# Patient Record
Sex: MALE | Race: OTHER | Employment: PART TIME | URBAN - METROPOLITAN AREA
[De-identification: names, ages, dates, MRNs, and addresses within clinical notes are randomized per-mention and may not be internally consistent; named-entity substitution may affect disease eponyms.]

---

## 2023-11-02 ENCOUNTER — APPOINTMENT (OUTPATIENT)
Dept: GENERAL RADIOLOGY | Age: 33
End: 2023-11-02
Payer: MEDICAID

## 2023-11-02 ENCOUNTER — HOSPITAL ENCOUNTER (EMERGENCY)
Age: 33
Discharge: HOME | End: 2023-11-02
Payer: MEDICAID

## 2023-11-02 VITALS
HEART RATE: 73 BPM | RESPIRATION RATE: 18 BRPM | DIASTOLIC BLOOD PRESSURE: 96 MMHG | TEMPERATURE: 99.1 F | SYSTOLIC BLOOD PRESSURE: 141 MMHG | OXYGEN SATURATION: 99 %

## 2023-11-02 DIAGNOSIS — S90.121A CONTUSION OF LESSER TOE OF RIGHT FOOT WITHOUT DAMAGE TO NAIL, INITIAL ENCOUNTER: Primary | ICD-10-CM

## 2023-11-02 PROCEDURE — 73630 X-RAY EXAM OF FOOT: CPT

## 2023-11-02 PROCEDURE — 99283 EMERGENCY DEPT VISIT LOW MDM: CPT

## 2023-11-02 NOTE — ED PROVIDER NOTES
Damon [facility] ENCOUNTER        Pt Name: Soheila Hanley  MRN: [id number]  [address] [facility] [place] 1990  Date of evaluation: 11/2/2023  Provider: Terrance Agosto PA-C  PCP: No primary care provider on file. Note Started: 1:15 PM EDT 11/2/23      ROSALBA. I have evaluated this patient. CHIEF COMPLAINT       Chief Complaint   Patient presents with    Foot Pain     Patient in with complaints of pain to the right foot/toe from injury        HISTORY OF PRESENT ILLNESS: 1 or more Elements     History From: patient  Limitations to history : Language language line [id number] was used to obtain history with     Soheila Hanley is a 33 y.o. male who presents to the emergency department with a chief complaint of right toe pain in his second and third toes. He states 4 days ago he was walking when he jammed his foot into the frame of the door. He denies any pain to sitting there and states that he has pain mostly when he is putting his shoes on. Denies any bleeding or any other injury. Nursing Notes were all reviewed and agreed with or any disagreements were addressed in the HPI. REVIEW OF SYSTEMS :      Review of Systems    Positives and Pertinent negatives as per HPI. SURGICAL HISTORY   History reviewed. No pertinent surgical history. CURRENTMEDICATIONS       There are no discharge medications for this patient. ALLERGIES     Penicillins    FAMILYHISTORY     History reviewed. No pertinent family history.      SOCIAL HISTORY       Social History     Tobacco Use    Smoking status: Never    Smokeless tobacco: Never   Substance Use Topics    Alcohol use: Never    Drug use: Never       SCREENINGS        Sandra Coma Scale  Eye Opening: Spontaneous  Best Verbal Response: Oriented  Best Motor Response: Obeys commands  Sandra Coma Scale Score: 15                CIWA Assessment  BP: (!) 141/96  Pulse: 73           PHYSICAL EXAM  1 or more

## 2023-11-02 NOTE — ED NOTES
This RN reviewed discharge instructions with patient. Informed pt about follow-up care. Pt had no questions, comments, or concerns at this time.        Vincent Wang RN  11/02/23 9076

## 2024-07-13 ENCOUNTER — HOSPITAL ENCOUNTER (EMERGENCY)
Age: 34
Discharge: HOME | End: 2024-07-13
Payer: MEDICAID

## 2024-07-13 DIAGNOSIS — Z53.21 ELOPED FROM EMERGENCY DEPARTMENT: Primary | ICD-10-CM

## 2024-07-13 DIAGNOSIS — R11.2 NAUSEA AND VOMITING, UNSPECIFIED VOMITING TYPE: ICD-10-CM

## 2024-07-13 DIAGNOSIS — R55 SYNCOPE AND COLLAPSE: ICD-10-CM

## 2024-07-13 LAB
EKG ATRIAL RATE: 59 BPM
EKG DIAGNOSIS: NORMAL
EKG P AXIS: 64 DEGREES
EKG P-R INTERVAL: 158 MS
EKG Q-T INTERVAL: 422 MS
EKG QRS DURATION: 96 MS
EKG QTC CALCULATION (BAZETT): 417 MS
EKG R AXIS: 40 DEGREES
EKG T AXIS: 22 DEGREES
EKG VENTRICULAR RATE: 59 BPM

## 2024-07-13 PROCEDURE — 99283 EMERGENCY DEPT VISIT LOW MDM: CPT

## 2024-07-13 PROCEDURE — 93005 ELECTROCARDIOGRAM TRACING: CPT | Performed by: STUDENT IN AN ORGANIZED HEALTH CARE EDUCATION/TRAINING PROGRAM

## 2024-07-13 PROCEDURE — 93010 ELECTROCARDIOGRAM REPORT: CPT | Performed by: INTERNAL MEDICINE

## 2024-07-13 ASSESSMENT — ENCOUNTER SYMPTOMS
SHORTNESS OF BREATH: 0
CHEST TIGHTNESS: 0
ABDOMINAL PAIN: 0
VOMITING: 1
DIARRHEA: 0
NAUSEA: 1

## 2024-07-13 NOTE — ED PROVIDER NOTES
[facility] EMERGENCY DEPARTMENT  EMERGENCY DEPARTMENT ENCOUNTER        Pt Name: Prosper Willams  MRN: [id number]  Birthdate 1990  Date of evaluation: 7/13/2024  Provider: AIDEN Campuzano CNP  PCP: No primary care provider on file.  Note Started: 1:57 AM EDT 7/13/24      ROSALBA. I have evaluated this patient.        CHIEF COMPLAINT       Nausea with vomiting that began today, patient passed out in the waiting room      HISTORY OF PRESENT ILLNESS: 1 or more Elements     History from : Patient    Limitations to history : Language Sinhala    Prosper Willams is a 33 y.o. male who presents to the ER with nausea and vomiting that began today, earlier without diarrhea.  The patient did pass out once he arrived to the emergency department.  He was moved back to triage to be for an EKG.  I did see the patient at that time, he is diaphoretic.  Complaining of abdominal pain and feeling poorly.    Denies any fever, visual disturbances.  No chest pain or pressure.  No neck or back pain.  No shortness of breath, cough, or congestion.  No diarrhea, constipation, or dysuria.  No rash.    Nursing Notes were all reviewed and agreed with or any disagreements were addressed in the HPI.    REVIEW OF SYSTEMS :      Review of Systems   Constitutional:  Positive for diaphoresis. Negative for activity change, chills and fever.   Respiratory:  Negative for chest tightness and shortness of breath.    Cardiovascular:  Negative for chest pain.   Gastrointestinal:  Positive for nausea and vomiting. Negative for abdominal pain and diarrhea.   Genitourinary:  Negative for dysuria.   Neurological:  Positive for syncope.   All other systems reviewed and are negative.      Positives and Pertinent negatives as per HPI.     SURGICAL HISTORY   No past surgical history on file.    CURRENTMEDICATIONS       Previous Medications    No medications on file       ALLERGIES     Penicillins    FAMILYHISTORY     No family

## 2024-08-02 ENCOUNTER — HOSPITAL ENCOUNTER (EMERGENCY)
Age: 34
Discharge: HOME | End: 2024-08-02
Attending: INTERNAL MEDICINE
Payer: MEDICAID

## 2024-08-02 VITALS
HEIGHT: 74 IN | HEART RATE: 100 BPM | TEMPERATURE: 100.3 F | OXYGEN SATURATION: 96 % | DIASTOLIC BLOOD PRESSURE: 87 MMHG | SYSTOLIC BLOOD PRESSURE: 140 MMHG | RESPIRATION RATE: 16 BRPM | WEIGHT: 205 LBS | BODY MASS INDEX: 26.31 KG/M2

## 2024-08-02 DIAGNOSIS — U07.1 COVID-19: Primary | ICD-10-CM

## 2024-08-02 LAB
FLUAV RNA RESP QL NAA+PROBE: NOT DETECTED
FLUBV RNA RESP QL NAA+PROBE: NOT DETECTED
S PYO AG THROAT QL: NEGATIVE
SARS-COV-2 RNA RESP QL NAA+PROBE: DETECTED

## 2024-08-02 PROCEDURE — 99283 EMERGENCY DEPT VISIT LOW MDM: CPT

## 2024-08-02 PROCEDURE — 87081 CULTURE SCREEN ONLY: CPT

## 2024-08-02 PROCEDURE — 6370000000 HC RX 637 (ALT 250 FOR IP): Performed by: INTERNAL MEDICINE

## 2024-08-02 PROCEDURE — 87880 STREP A ASSAY W/OPTIC: CPT

## 2024-08-02 PROCEDURE — 87636 SARSCOV2 & INF A&B AMP PRB: CPT

## 2024-08-02 RX ORDER — CETIRIZINE HYDROCHLORIDE 10 MG/1
10 TABLET ORAL ONCE
Status: COMPLETED | OUTPATIENT
Start: 2024-08-02 | End: 2024-08-02

## 2024-08-02 RX ORDER — CETIRIZINE HYDROCHLORIDE 10 MG/1
10 TABLET ORAL DAILY
Qty: 30 TABLET | Refills: 0 | Status: SHIPPED | OUTPATIENT
Start: 2024-08-02

## 2024-08-02 RX ORDER — NAPROXEN 250 MG/1
250 TABLET ORAL 2 TIMES DAILY WITH MEALS
Qty: 60 TABLET | Refills: 0 | Status: SHIPPED | OUTPATIENT
Start: 2024-08-02

## 2024-08-02 RX ORDER — NAPROXEN 250 MG/1
250 TABLET ORAL ONCE
Status: COMPLETED | OUTPATIENT
Start: 2024-08-02 | End: 2024-08-02

## 2024-08-02 RX ADMIN — NAPROXEN 250 MG: 250 TABLET ORAL at 10:52

## 2024-08-02 RX ADMIN — CETIRIZINE HYDROCHLORIDE 10 MG: 10 TABLET, FILM COATED ORAL at 11:39

## 2024-08-02 ASSESSMENT — PAIN SCALES - GENERAL: PAINLEVEL_OUTOF10: 0

## 2024-08-02 NOTE — ED PROVIDER NOTES
EMERGENCY MEDICINE PROVIDER NOTE    Patient Identification  Pt Name: Prosper Willams  MRN: [id number]  Birthdate 1990  Date of evaluation: 8/2/2024  Provider: ANIBAL LENOE DO  PCP: No primary care provider on file.    Chief Complaint  Sore Throat (Pt to ED c/o sore throat, fever, joint pain, and nasal congestion since this past Tuesday. PT reports taking tylenol (last dose at 0400). ), Fever, Congestion, and Joint Pain      HPI  (History provided by patient)  This is a 33 y.o. male who was brought in by self for sore throat, fever, generalized joint pain and upper nasal congestion that started on Tuesday.  Patient did take Tylenol this morning at 4 AM.  Patient denies any cough.  Patient denies shortness of breath.  He does not have any neck pain or neck stiffness.  Patient does speak Russian and  was used.  Patient is joint pain is moderate in intensity.  Patient did go to the pharmacy and tried to get an antibiotic but was told that he needs a prescription so he came to the ER to be evaluated.    I have reviewed the following nursing documentation:  Allergies: Penicillins    Past medical history:   Past Medical History:   Diagnosis Date    Hypertension      Past surgical history:   Past Surgical History:   Procedure Laterality Date    HERNIA REPAIR         Home medications:   Discharge Medication List as of 8/2/2024 11:37 AM          Social history:  reports that he has never smoked. He has never used smokeless tobacco. He reports that he does not drink alcohol and does not use drugs.    Family history:  History reviewed. No pertinent family history.    Exam  ED Triage Vitals   BP Temp Temp Source Pulse Respirations SpO2 Height Weight - Scale   08/02/24 1032 08/02/24 1032 08/02/24 1032 08/02/24 1032 08/02/24 1032 08/02/24 1032 08/02/24 1031 08/02/24 1031   (!) 140/87 100.3 °F (37.9 °C) Oral (!) 109 16 96 % 1.88 m (6' 2\") 93 kg (205 lb)     Nursing note and vitals reviewed.  General: Patient does

## 2024-08-03 ENCOUNTER — HOSPITAL ENCOUNTER (EMERGENCY)
Age: 34
Discharge: HOME | End: 2024-08-03
Payer: MEDICAID

## 2024-08-03 VITALS
OXYGEN SATURATION: 98 % | HEART RATE: 88 BPM | SYSTOLIC BLOOD PRESSURE: 139 MMHG | RESPIRATION RATE: 16 BRPM | BODY MASS INDEX: 26.31 KG/M2 | HEIGHT: 74 IN | DIASTOLIC BLOOD PRESSURE: 84 MMHG | TEMPERATURE: 98.1 F | WEIGHT: 205 LBS

## 2024-08-03 DIAGNOSIS — R21 RASH AND OTHER NONSPECIFIC SKIN ERUPTION: Primary | ICD-10-CM

## 2024-08-03 PROCEDURE — 99283 EMERGENCY DEPT VISIT LOW MDM: CPT

## 2024-08-03 PROCEDURE — 6370000000 HC RX 637 (ALT 250 FOR IP): Performed by: PHYSICIAN ASSISTANT

## 2024-08-03 RX ORDER — PREDNISONE 50 MG/1
50 TABLET ORAL DAILY
Qty: 5 TABLET | Refills: 0 | Status: SHIPPED | OUTPATIENT
Start: 2024-08-03 | End: 2024-08-08

## 2024-08-03 RX ORDER — PREDNISONE 20 MG/1
60 TABLET ORAL ONCE
Status: COMPLETED | OUTPATIENT
Start: 2024-08-03 | End: 2024-08-03

## 2024-08-03 RX ADMIN — PREDNISONE 60 MG: 20 TABLET ORAL at 12:36

## 2024-08-03 ASSESSMENT — ENCOUNTER SYMPTOMS
EYE PAIN: 0
DIARRHEA: 0
CONSTIPATION: 0
VOMITING: 0
NAUSEA: 0
SORE THROAT: 0
COUGH: 0
ABDOMINAL PAIN: 0
RHINORRHEA: 0
BACK PAIN: 0
SHORTNESS OF BREATH: 0

## 2024-08-03 ASSESSMENT — LIFESTYLE VARIABLES
HOW OFTEN DO YOU HAVE A DRINK CONTAINING ALCOHOL: NEVER
HOW MANY STANDARD DRINKS CONTAINING ALCOHOL DO YOU HAVE ON A TYPICAL DAY: PATIENT DOES NOT DRINK

## 2024-08-03 ASSESSMENT — PAIN - FUNCTIONAL ASSESSMENT: PAIN_FUNCTIONAL_ASSESSMENT: NONE - DENIES PAIN

## 2024-08-03 NOTE — ED PROVIDER NOTES
[facility] EMERGENCY DEPARTMENT  EMERGENCY DEPARTMENT ENCOUNTER        Pt Name: Prosper Willams  MRN: [id number]  Birthdate 1990  Date of evaluation: 8/3/2024  Provider: CRISTIAN Chase  PCP: No primary care provider on file.  Note Started: 3:46 PM EDT 8/3/24      ROSALBA. I have evaluated this patient.        CHIEF COMPLAINT       Chief Complaint   Patient presents with    Rash     Patient seen in this emergency room yesterday, prescribed several medicines. States was able to fill all medicines except for Paxlovid due to cost. Pt requesting an antibiotic because it is cheaper. Also c/o rash around neck first noticed today.       HISTORY OF PRESENT ILLNESS: 1 or more Elements     History from : Patient    Limitations to history : Language  used for translation    Prosper Willams is a 33 y.o. male who presents to the emergency room due to itchy rash on the neck and arms that started past week.  Patient states that his symptoms started when he started with COVID symptoms this past week.  He was seen here yesterday and was diagnosed with COVID-19.  Patient states that he started the other medications after the Paxlovid as it was too expensive for him.  He denies any chest pain shortness of breath or difficulty breathing.  He denies any fevers or chills today.  He states that his COVID symptoms have improved.  Patient stated the rash is very itchy he has not use any medication to help with the symptoms yet.    Nursing Notes were all reviewed and agreed with or any disagreements were addressed in the HPI.    REVIEW OF SYSTEMS :      Review of Systems   Constitutional:  Negative for chills, diaphoresis and fever.   HENT:  Negative for congestion, rhinorrhea and sore throat.    Eyes:  Negative for pain and visual disturbance.   Respiratory:  Negative for cough and shortness of breath.    Cardiovascular:  Negative for chest pain and leg swelling.   Gastrointestinal:  Negative  management        I am the Primary Clinician of Record.    FINAL IMPRESSION      1. Rash and other nonspecific skin eruption          DISPOSITION/PLAN     DISPOSITION Decision To Discharge 08/03/2024 12:28:21 PM      PATIENT REFERRED TO:  [facility]  [phone]  Schedule an appointment as soon as possible for a visit in 1 week  [facility] Emergency Department  [address] [address]  [phone]    As needed, If symptoms worsen      DISCHARGE MEDICATIONS:  Discharge Medication List as of 8/3/2024 12:35 PM        START taking these medications    Details   predniSONE (DELTASONE) 50 MG tablet Take 1 tablet by mouth daily for 5 days, Disp-5 tablet, R-0Normal             DISCONTINUED MEDICATIONS:  Discharge Medication List as of 8/3/2024 12:35 PM                 (Please note that portions of this note were completed with a voice recognition program.  Efforts were made to edit the dictations but occasionally words are mis-transcribed.)    CRISTIAN Chase (electronically signed)            Nabeel Dnucan PA  08/03/24 [id number]

## 2024-08-04 LAB — S PYO THROAT QL CULT: NORMAL

## 2024-08-18 ENCOUNTER — HOSPITAL ENCOUNTER (EMERGENCY)
Age: 34
Discharge: HOME | End: 2024-08-18
Payer: MEDICAID

## 2024-08-18 ENCOUNTER — APPOINTMENT (OUTPATIENT)
Dept: GENERAL RADIOLOGY | Age: 34
End: 2024-08-18
Payer: MEDICAID

## 2024-08-18 ENCOUNTER — APPOINTMENT (OUTPATIENT)
Dept: CT IMAGING | Age: 34
End: 2024-08-18
Payer: MEDICAID

## 2024-08-18 VITALS
RESPIRATION RATE: 18 BRPM | HEART RATE: 72 BPM | DIASTOLIC BLOOD PRESSURE: 75 MMHG | SYSTOLIC BLOOD PRESSURE: 125 MMHG | TEMPERATURE: 99 F | WEIGHT: 205 LBS | BODY MASS INDEX: 26.32 KG/M2 | OXYGEN SATURATION: 99 %

## 2024-08-18 DIAGNOSIS — R10.13 ABDOMINAL PAIN, EPIGASTRIC: Primary | ICD-10-CM

## 2024-08-18 DIAGNOSIS — K80.20 CALCULUS OF GALLBLADDER WITHOUT CHOLECYSTITIS WITHOUT OBSTRUCTION: ICD-10-CM

## 2024-08-18 DIAGNOSIS — E86.0 DEHYDRATION: ICD-10-CM

## 2024-08-18 DIAGNOSIS — R11.2 NAUSEA AND VOMITING, UNSPECIFIED VOMITING TYPE: ICD-10-CM

## 2024-08-18 LAB
ALBUMIN SERPL-MCNC: 4.2 G/DL (ref 3.4–5)
ALBUMIN/GLOB SERPL: 1.4 {RATIO} (ref 1.1–2.2)
ALP SERPL-CCNC: 105 U/L (ref 40–129)
ALT SERPL-CCNC: 56 U/L (ref 10–40)
ANION GAP SERPL CALCULATED.3IONS-SCNC: 9 MMOL/L (ref 3–16)
AST SERPL-CCNC: 25 U/L (ref 15–37)
BACTERIA URNS QL MICRO: NORMAL /HPF
BASOPHILS # BLD: 0 K/UL (ref 0–0.2)
BASOPHILS NFR BLD: 0.4 %
BILIRUB SERPL-MCNC: <0.2 MG/DL (ref 0–1)
BILIRUB UR QL STRIP.AUTO: NEGATIVE
BUN SERPL-MCNC: 14 MG/DL (ref 7–20)
CALCIUM SERPL-MCNC: 9.3 MG/DL (ref 8.3–10.6)
CHLORIDE SERPL-SCNC: 104 MMOL/L (ref 99–110)
CLARITY UR: ABNORMAL
CO2 SERPL-SCNC: 26 MMOL/L (ref 21–32)
COLOR UR: YELLOW
CREAT SERPL-MCNC: 1.5 MG/DL (ref 0.9–1.3)
DEPRECATED RDW RBC AUTO: 13.9 % (ref 12.4–15.4)
EOSINOPHIL # BLD: 0.6 K/UL (ref 0–0.6)
EOSINOPHIL NFR BLD: 5.3 %
EPI CELLS #/AREA URNS AUTO: 1 /HPF (ref 0–5)
GFR SERPLBLD CREATININE-BSD FMLA CKD-EPI: 63 ML/MIN/{1.73_M2}
GLUCOSE SERPL-MCNC: 126 MG/DL (ref 70–99)
GLUCOSE UR STRIP.AUTO-MCNC: NEGATIVE MG/DL
HCT VFR BLD AUTO: 45.2 % (ref 40.5–52.5)
HGB BLD-MCNC: 15.1 G/DL (ref 13.5–17.5)
HGB UR QL STRIP.AUTO: NEGATIVE
HYALINE CASTS #/AREA URNS AUTO: 0 /LPF (ref 0–8)
KETONES UR STRIP.AUTO-MCNC: NEGATIVE MG/DL
LEUKOCYTE ESTERASE UR QL STRIP.AUTO: NEGATIVE
LIPASE SERPL-CCNC: 29 U/L (ref 13–60)
LYMPHOCYTES # BLD: 2 K/UL (ref 1–5.1)
LYMPHOCYTES NFR BLD: 17.1 %
MCH RBC QN AUTO: 29.2 PG (ref 26–34)
MCHC RBC AUTO-ENTMCNC: 33.5 G/DL (ref 31–36)
MCV RBC AUTO: 87.3 FL (ref 80–100)
MONOCYTES # BLD: 1 K/UL (ref 0–1.3)
MONOCYTES NFR BLD: 8.9 %
NEUTROPHILS # BLD: 7.9 K/UL (ref 1.7–7.7)
NEUTROPHILS NFR BLD: 68.3 %
NITRITE UR QL STRIP.AUTO: NEGATIVE
PH UR STRIP.AUTO: 8 [PH] (ref 5–8)
PLATELET # BLD AUTO: 250 K/UL (ref 135–450)
PMV BLD AUTO: 8 FL (ref 5–10.5)
POTASSIUM SERPL-SCNC: 4.4 MMOL/L (ref 3.5–5.1)
PROT SERPL-MCNC: 7.3 G/DL (ref 6.4–8.2)
PROT UR STRIP.AUTO-MCNC: ABNORMAL MG/DL
RBC # BLD AUTO: 5.18 M/UL (ref 4.2–5.9)
RBC CLUMPS #/AREA URNS AUTO: 1 /HPF (ref 0–4)
SODIUM SERPL-SCNC: 139 MMOL/L (ref 136–145)
SP GR UR STRIP.AUTO: 1.02 (ref 1–1.03)
UA COMPLETE W REFLEX CULTURE PNL UR: ABNORMAL
UA DIPSTICK W REFLEX MICRO PNL UR: YES
URN SPEC COLLECT METH UR: ABNORMAL
UROBILINOGEN UR STRIP-ACNC: 1 E.U./DL
WBC # BLD AUTO: 11.6 K/UL (ref 4–11)
WBC #/AREA URNS AUTO: 1 /HPF (ref 0–5)

## 2024-08-18 PROCEDURE — 6360000004 HC RX CONTRAST MEDICATION: Performed by: PHYSICIAN ASSISTANT

## 2024-08-18 PROCEDURE — 81001 URINALYSIS AUTO W/SCOPE: CPT

## 2024-08-18 PROCEDURE — 96361 HYDRATE IV INFUSION ADD-ON: CPT

## 2024-08-18 PROCEDURE — 6360000002 HC RX W HCPCS: Performed by: PHYSICIAN ASSISTANT

## 2024-08-18 PROCEDURE — 2580000003 HC RX 258: Performed by: PHYSICIAN ASSISTANT

## 2024-08-18 PROCEDURE — 85025 COMPLETE CBC W/AUTO DIFF WBC: CPT

## 2024-08-18 PROCEDURE — 74177 CT ABD & PELVIS W/CONTRAST: CPT

## 2024-08-18 PROCEDURE — 99285 EMERGENCY DEPT VISIT HI MDM: CPT

## 2024-08-18 PROCEDURE — 96375 TX/PRO/DX INJ NEW DRUG ADDON: CPT

## 2024-08-18 PROCEDURE — 80053 COMPREHEN METABOLIC PANEL: CPT

## 2024-08-18 PROCEDURE — 71045 X-RAY EXAM CHEST 1 VIEW: CPT

## 2024-08-18 PROCEDURE — 96374 THER/PROPH/DIAG INJ IV PUSH: CPT

## 2024-08-18 PROCEDURE — 83690 ASSAY OF LIPASE: CPT

## 2024-08-18 RX ORDER — NAPROXEN 500 MG/1
500 TABLET ORAL 2 TIMES DAILY PRN
Qty: 20 TABLET | Refills: 0 | Status: SHIPPED | OUTPATIENT
Start: 2024-08-18

## 2024-08-18 RX ORDER — KETOROLAC TROMETHAMINE 15 MG/ML
15 INJECTION, SOLUTION INTRAMUSCULAR; INTRAVENOUS ONCE
Status: COMPLETED | OUTPATIENT
Start: 2024-08-18 | End: 2024-08-18

## 2024-08-18 RX ORDER — ONDANSETRON 2 MG/ML
4 INJECTION INTRAMUSCULAR; INTRAVENOUS ONCE
Status: COMPLETED | OUTPATIENT
Start: 2024-08-18 | End: 2024-08-18

## 2024-08-18 RX ORDER — SODIUM CHLORIDE, SODIUM LACTATE, POTASSIUM CHLORIDE, AND CALCIUM CHLORIDE .6; .31; .03; .02 G/100ML; G/100ML; G/100ML; G/100ML
1000 INJECTION, SOLUTION INTRAVENOUS ONCE
Status: COMPLETED | OUTPATIENT
Start: 2024-08-18 | End: 2024-08-18

## 2024-08-18 RX ORDER — ONDANSETRON 4 MG/1
4 TABLET, ORALLY DISINTEGRATING ORAL 3 TIMES DAILY PRN
Qty: 21 TABLET | Refills: 0 | Status: SHIPPED | OUTPATIENT
Start: 2024-08-18

## 2024-08-18 RX ADMIN — KETOROLAC TROMETHAMINE 15 MG: 15 INJECTION, SOLUTION INTRAMUSCULAR; INTRAVENOUS at 19:30

## 2024-08-18 RX ADMIN — IOPAMIDOL 75 ML: 755 INJECTION, SOLUTION INTRAVENOUS at 20:15

## 2024-08-18 RX ADMIN — SODIUM CHLORIDE, POTASSIUM CHLORIDE, SODIUM LACTATE AND CALCIUM CHLORIDE 1000 ML: 600; 310; 30; 20 INJECTION, SOLUTION INTRAVENOUS at 20:52

## 2024-08-18 RX ADMIN — ONDANSETRON 4 MG: 2 INJECTION INTRAMUSCULAR; INTRAVENOUS at 19:30

## 2024-08-18 ASSESSMENT — LIFESTYLE VARIABLES: HOW OFTEN DO YOU HAVE A DRINK CONTAINING ALCOHOL: NEVER

## 2024-08-19 NOTE — ED PROVIDER NOTES
[facility] EMERGENCY DEPARTMENT  EMERGENCY DEPARTMENT ENCOUNTER        Pt Name: Prosper Willams  MRN: [id number]  Birthdate 1990  Date of evaluation: 8/18/2024  Provider: Portillo Ellis PA-C  PCP: No primary care provider on file.  Note Started: 10:32 PM EDT 8/18/24      ROSALBA. I have evaluated this patient.        CHIEF COMPLAINT       Chief Complaint   Patient presents with    Positive For Covid-19     Setswana interp [id number]. Pt states he tested positive for covid, has had increased nausea and emesis the past three days.     Abdominal Pain     Pt reports abdominal pain after he eats        HISTORY OF PRESENT ILLNESS: 1 or more Elements     History From: pt  Limitations to history : Language Setswana, language line     Prosper Willams is a 33 y.o. male who presents complaining of abdominal pain and vomiting for the last 3 days.  Patient reports he tested positive for COVID a few weeks ago, was feeling improved for about a week and then started having abdominal pain and vomiting.  Abdominal pain is gone now, is intermittently epigastric and right upper quadrant with associated vomiting, worse with eating.  Denies prior abdominal surgery, fever, blood in stool or emesis, constipation, diarrhea, change in urination.    Nursing Notes were all reviewed and agreed with or any disagreements were addressed in the HPI.    REVIEW OF SYSTEMS :      Review of Systems   All other systems reviewed and are negative.      Positives and Pertinent negatives as per HPI.       PAST MEDICAL HISTORY    has a past medical history of Hypertension.     SURGICAL HISTORY     Past Surgical History:   Procedure Laterality Date    HERNIA REPAIR         CURRENTMEDICATIONS       Discharge Medication List as of 8/18/2024 10:18 PM        CONTINUE these medications which have NOT CHANGED    Details   cetirizine (ZYRTEC) 10 MG tablet Take 1 tablet by mouth daily, Disp-30 tablet, R-0Normal             ALLERGIES

## 2024-12-02 ENCOUNTER — HOSPITAL ENCOUNTER (EMERGENCY)
Age: 34
Discharge: HOME | End: 2024-12-02
Payer: MEDICAID

## 2024-12-02 ENCOUNTER — APPOINTMENT (OUTPATIENT)
Dept: CT IMAGING | Age: 34
End: 2024-12-02
Payer: MEDICAID

## 2024-12-02 VITALS
TEMPERATURE: 98.3 F | BODY MASS INDEX: 26.31 KG/M2 | HEART RATE: 68 BPM | DIASTOLIC BLOOD PRESSURE: 75 MMHG | SYSTOLIC BLOOD PRESSURE: 151 MMHG | WEIGHT: 205 LBS | RESPIRATION RATE: 18 BRPM | OXYGEN SATURATION: 97 % | HEIGHT: 74 IN

## 2024-12-02 DIAGNOSIS — R10.13 ABDOMINAL PAIN, EPIGASTRIC: Primary | ICD-10-CM

## 2024-12-02 LAB
ALBUMIN SERPL-MCNC: 4.3 G/DL (ref 3.4–5)
ALBUMIN/GLOB SERPL: 1.3 {RATIO} (ref 1.1–2.2)
ALP SERPL-CCNC: 102 U/L (ref 40–129)
ALT SERPL-CCNC: 38 U/L (ref 10–40)
ANION GAP SERPL CALCULATED.3IONS-SCNC: 8 MMOL/L (ref 3–16)
AST SERPL-CCNC: 28 U/L (ref 15–37)
BASOPHILS # BLD: 0.1 K/UL (ref 0–0.2)
BASOPHILS NFR BLD: 0.7 %
BILIRUB SERPL-MCNC: 0.3 MG/DL (ref 0–1)
BILIRUB UR QL STRIP.AUTO: NEGATIVE
BUN SERPL-MCNC: 12 MG/DL (ref 7–20)
CALCIUM SERPL-MCNC: 10.1 MG/DL (ref 8.3–10.6)
CHLORIDE SERPL-SCNC: 101 MMOL/L (ref 99–110)
CLARITY UR: CLEAR
CO2 SERPL-SCNC: 27 MMOL/L (ref 21–32)
COLOR UR: YELLOW
CREAT SERPL-MCNC: 1 MG/DL (ref 0.9–1.3)
DEPRECATED RDW RBC AUTO: 13.3 % (ref 12.4–15.4)
EOSINOPHIL # BLD: 1.2 K/UL (ref 0–0.6)
EOSINOPHIL NFR BLD: 12.7 %
GFR SERPLBLD CREATININE-BSD FMLA CKD-EPI: >90 ML/MIN/{1.73_M2}
GLUCOSE SERPL-MCNC: 102 MG/DL (ref 70–99)
GLUCOSE UR STRIP.AUTO-MCNC: NEGATIVE MG/DL
HCT VFR BLD AUTO: 45.3 % (ref 40.5–52.5)
HGB BLD-MCNC: 15.4 G/DL (ref 13.5–17.5)
HGB UR QL STRIP.AUTO: NEGATIVE
KETONES UR STRIP.AUTO-MCNC: NEGATIVE MG/DL
LEUKOCYTE ESTERASE UR QL STRIP.AUTO: NEGATIVE
LIPASE SERPL-CCNC: 25 U/L (ref 13–60)
LYMPHOCYTES # BLD: 1.6 K/UL (ref 1–5.1)
LYMPHOCYTES NFR BLD: 17.4 %
MCH RBC QN AUTO: 29.2 PG (ref 26–34)
MCHC RBC AUTO-ENTMCNC: 34 G/DL (ref 31–36)
MCV RBC AUTO: 86.1 FL (ref 80–100)
MONOCYTES # BLD: 0.6 K/UL (ref 0–1.3)
MONOCYTES NFR BLD: 6 %
NEUTROPHILS # BLD: 5.9 K/UL (ref 1.7–7.7)
NEUTROPHILS NFR BLD: 63.2 %
NITRITE UR QL STRIP.AUTO: NEGATIVE
PH UR STRIP.AUTO: 5.5 [PH] (ref 5–8)
PLATELET # BLD AUTO: 236 K/UL (ref 135–450)
PMV BLD AUTO: 8.1 FL (ref 5–10.5)
POTASSIUM SERPL-SCNC: 4.4 MMOL/L (ref 3.5–5.1)
PROT SERPL-MCNC: 7.5 G/DL (ref 6.4–8.2)
PROT UR STRIP.AUTO-MCNC: NEGATIVE MG/DL
RBC # BLD AUTO: 5.26 M/UL (ref 4.2–5.9)
SODIUM SERPL-SCNC: 136 MMOL/L (ref 136–145)
SP GR UR STRIP.AUTO: 1.02 (ref 1–1.03)
UA COMPLETE W REFLEX CULTURE PNL UR: NORMAL
UA DIPSTICK W REFLEX MICRO PNL UR: NORMAL
URN SPEC COLLECT METH UR: NORMAL
UROBILINOGEN UR STRIP-ACNC: 0.2 E.U./DL
WBC # BLD AUTO: 9.4 K/UL (ref 4–11)

## 2024-12-02 PROCEDURE — 81003 URINALYSIS AUTO W/O SCOPE: CPT

## 2024-12-02 PROCEDURE — 80053 COMPREHEN METABOLIC PANEL: CPT

## 2024-12-02 PROCEDURE — 83690 ASSAY OF LIPASE: CPT

## 2024-12-02 PROCEDURE — 96374 THER/PROPH/DIAG INJ IV PUSH: CPT

## 2024-12-02 PROCEDURE — 36415 COLL VENOUS BLD VENIPUNCTURE: CPT

## 2024-12-02 PROCEDURE — 85025 COMPLETE CBC W/AUTO DIFF WBC: CPT

## 2024-12-02 PROCEDURE — 6360000002 HC RX W HCPCS: Performed by: PHYSICIAN ASSISTANT

## 2024-12-02 PROCEDURE — 99284 EMERGENCY DEPT VISIT MOD MDM: CPT

## 2024-12-02 RX ORDER — KETOROLAC TROMETHAMINE 15 MG/ML
15 INJECTION, SOLUTION INTRAMUSCULAR; INTRAVENOUS ONCE
Status: COMPLETED | OUTPATIENT
Start: 2024-12-02 | End: 2024-12-02

## 2024-12-02 RX ORDER — IOPAMIDOL 755 MG/ML
75 INJECTION, SOLUTION INTRAVASCULAR
Status: DISCONTINUED | OUTPATIENT
Start: 2024-12-02 | End: 2024-12-02 | Stop reason: HOSPADM

## 2024-12-02 RX ADMIN — KETOROLAC TROMETHAMINE 15 MG: 15 INJECTION, SOLUTION INTRAMUSCULAR; INTRAVENOUS at 08:55

## 2024-12-02 ASSESSMENT — PAIN - FUNCTIONAL ASSESSMENT: PAIN_FUNCTIONAL_ASSESSMENT: 0-10

## 2024-12-02 ASSESSMENT — ENCOUNTER SYMPTOMS
RECTAL PAIN: 0
ABDOMINAL PAIN: 1
ABDOMINAL DISTENTION: 0
VOMITING: 0
SHORTNESS OF BREATH: 0
BACK PAIN: 0
BLOOD IN STOOL: 0
COLOR CHANGE: 0
STRIDOR: 0
DIARRHEA: 0
ANAL BLEEDING: 0
COUGH: 0
NAUSEA: 0
WHEEZING: 0
CONSTIPATION: 0

## 2024-12-02 ASSESSMENT — PAIN DESCRIPTION - LOCATION: LOCATION: ABDOMEN

## 2024-12-02 ASSESSMENT — LIFESTYLE VARIABLES: HOW OFTEN DO YOU HAVE A DRINK CONTAINING ALCOHOL: NEVER

## 2024-12-02 ASSESSMENT — PAIN SCALES - GENERAL: PAINLEVEL_OUTOF10: 7

## 2024-12-02 NOTE — ED PROVIDER NOTES
be discharged.    The risks have been explained to the patient, including worsening illness, chronic pain, permanent disability, and death.    The benefits of admission have also been explained, including the availability and proximity of nurses, physicians, monitoring, diagnostic testing, and treatment.    The patient was able to understand and state the risks and benefits of hospital admission.  This was witnessed by nursing and myself    He had the opportunity to ask questions about his medical condition.    The patient was treated to the extent that he would allow, and knows that he may return for care at any time.      Appropriate for outpatient management        I am the Primary Clinician of Record.    FINAL IMPRESSION      1. Abdominal pain, epigastric          DISPOSITION/PLAN     DISPOSITION [place] 12/02/2024 10:23:26 AM           PATIENT REFERRED TO:  No follow-up provider specified.    DISCHARGE MEDICATIONS:  New Prescriptions    No medications on file       DISCONTINUED MEDICATIONS:  Discontinued Medications    No medications on file              (Please note that portions of this note were completed with a voice recognition program.  Efforts were made to edit the dictations but occasionally words are mis-transcribed.)    Trace Dolan PA-C (electronically signed)            Trace Dolan PA-C  12/02/24 1022

## 2024-12-03 NOTE — ED NOTES
Chart note written 12/3/24 @ 1601.  Pt walked into ER requesting a work note. Pt made aware that he left AMA and we can not write a work note to return to work. Pt verbalized understanding.